# Patient Record
Sex: MALE | Race: WHITE | URBAN - METROPOLITAN AREA
[De-identification: names, ages, dates, MRNs, and addresses within clinical notes are randomized per-mention and may not be internally consistent; named-entity substitution may affect disease eponyms.]

---

## 2022-10-14 ENCOUNTER — OFFICE VISIT (OUTPATIENT)
Dept: URBAN - METROPOLITAN AREA CLINIC 17 | Facility: CLINIC | Age: 72
End: 2022-10-14
Payer: MEDICARE

## 2022-10-14 DIAGNOSIS — H25.813 COMBINED FORMS OF AGE-RELATED CATARACT, BILATERAL: ICD-10-CM

## 2022-10-14 DIAGNOSIS — H04.123 DRY EYE SYNDROME OF BILATERAL LACRIMAL GLANDS: ICD-10-CM

## 2022-10-14 DIAGNOSIS — E11.9 TYPE 2 DIABETES MELLITUS W/O COMPLICATION: Primary | ICD-10-CM

## 2022-10-14 DIAGNOSIS — H40.013 OPEN ANGLE WITH BORDERLINE FINDINGS, LOW RISK, BILATERAL: ICD-10-CM

## 2022-10-14 PROCEDURE — 92133 CPTRZD OPH DX IMG PST SGM ON: CPT

## 2022-10-14 PROCEDURE — 99204 OFFICE O/P NEW MOD 45 MIN: CPT

## 2022-10-14 ASSESSMENT — INTRAOCULAR PRESSURE
OS: 19
OD: 19

## 2022-10-14 NOTE — IMPRESSION/PLAN
Impression: Open angle with borderline findings, low risk, bilateral: H40.013.
-RNFL OCT ordered today showing borderline thin superior and inferior OD, with borderline thin superior OS
-(-)family history
-pressures of 19/19 today Plan: Patient educated on ocular findings. Condition and IOP stable without medication. Careful observation was discussed and is strongly recommended to prevent possible future vision loss. Will continue to monitor intraocular pressure and testing in clinic at regular intervals. No treatment is being implemented at this time.

## 2022-10-14 NOTE — IMPRESSION/PLAN
Impression: Type 2 diabetes mellitus w/o complication: X62.2. Plan: Patient educated on all ocular findings. Stressed importance of following up with primary care physician, tight control over blood sugar, maintaining healthy diet, strict adherence to medication, and exercise. Patient is advised that a yearly ophthalmic exam is recommended. Return sooner if any new symptoms.

## 2022-10-14 NOTE — IMPRESSION/PLAN
Impression: Combined forms of age-related cataract, bilateral: H25.813. Plan: Patient educated on ocular findings. Visual functioning is good, and cataract surgery is not required at this time. Further advised there is no specific urgency for cataract surgery. They were also advised that having cataract surgery does not mean they will not need to use glasses or contact lenses. We will continue to monitor and they are advised to call or return if any new symptoms.

## 2023-10-23 ENCOUNTER — OFFICE VISIT (OUTPATIENT)
Dept: URBAN - METROPOLITAN AREA CLINIC 17 | Facility: CLINIC | Age: 73
End: 2023-10-23
Payer: MEDICARE

## 2023-10-23 DIAGNOSIS — H43.811 VITREOUS DEGENERATION, RIGHT EYE: ICD-10-CM

## 2023-10-23 DIAGNOSIS — E11.9 TYPE 2 DIABETES MELLITUS W/O COMPLICATION: Primary | ICD-10-CM

## 2023-10-23 DIAGNOSIS — H25.813 COMBINED FORMS OF AGE-RELATED CATARACT, BILATERAL: ICD-10-CM

## 2023-10-23 DIAGNOSIS — H40.1134 PRIMARY OPEN-ANGLE GLAUCOMA, BILATERAL, INDETERMINATE STAGE: ICD-10-CM

## 2023-10-23 PROCEDURE — 92133 CPTRZD OPH DX IMG PST SGM ON: CPT | Performed by: OPTOMETRIST

## 2023-10-23 PROCEDURE — 76514 ECHO EXAM OF EYE THICKNESS: CPT | Performed by: OPTOMETRIST

## 2023-10-23 PROCEDURE — 99214 OFFICE O/P EST MOD 30 MIN: CPT | Performed by: OPTOMETRIST

## 2023-10-23 RX ORDER — LATANOPROST 50 UG/ML
0.005 % SOLUTION OPHTHALMIC
Qty: 7.5 | Refills: 1 | Status: ACTIVE
Start: 2023-10-23

## 2023-10-23 ASSESSMENT — INTRAOCULAR PRESSURE
OD: 18
OS: 18

## 2024-01-05 ENCOUNTER — OFFICE VISIT (OUTPATIENT)
Dept: URBAN - METROPOLITAN AREA CLINIC 17 | Facility: CLINIC | Age: 74
End: 2024-01-05
Payer: MEDICARE

## 2024-01-05 DIAGNOSIS — H40.1132 PRIMARY OPEN-ANGLE GLAUCOMA, BILATERAL, MODERATE STAGE: Primary | ICD-10-CM

## 2024-01-05 DIAGNOSIS — H40.1134 PRIMARY OPEN-ANGLE GLAUCOMA, BILATERAL, INDETERMINATE STAGE: ICD-10-CM

## 2024-01-05 PROCEDURE — 99213 OFFICE O/P EST LOW 20 MIN: CPT | Performed by: OPTOMETRIST

## 2024-01-05 PROCEDURE — 92083 EXTENDED VISUAL FIELD XM: CPT | Performed by: OPTOMETRIST

## 2024-01-05 RX ORDER — LATANOPROST 50 UG/ML
0.005 % SOLUTION OPHTHALMIC
Qty: 7.5 | Refills: 1 | Status: ACTIVE
Start: 2024-01-05

## 2024-01-05 ASSESSMENT — INTRAOCULAR PRESSURE
OD: 16
OS: 17

## 2024-04-09 ENCOUNTER — OFFICE VISIT (OUTPATIENT)
Dept: URBAN - METROPOLITAN AREA CLINIC 17 | Facility: CLINIC | Age: 74
End: 2024-04-09
Payer: MEDICARE

## 2024-04-09 DIAGNOSIS — H40.1121 PRIMARY OPEN-ANGLE GLAUCOMA, LEFT EYE, MILD STAGE: ICD-10-CM

## 2024-04-09 DIAGNOSIS — H40.1112 PRIMARY OPEN-ANGLE GLAUCOMA, RIGHT EYE, MODERATE STAGE: Primary | ICD-10-CM

## 2024-04-09 PROCEDURE — 99213 OFFICE O/P EST LOW 20 MIN: CPT | Performed by: OPTOMETRIST

## 2024-04-09 RX ORDER — LATANOPROST 50 UG/ML
0.005 % SOLUTION OPHTHALMIC
Qty: 7.5 | Refills: 1 | Status: ACTIVE
Start: 2024-04-09

## 2024-04-09 ASSESSMENT — INTRAOCULAR PRESSURE
OD: 16
OS: 15

## 2024-10-15 ENCOUNTER — OFFICE VISIT (OUTPATIENT)
Dept: URBAN - METROPOLITAN AREA CLINIC 17 | Facility: CLINIC | Age: 74
End: 2024-10-15
Payer: MEDICARE

## 2024-10-15 DIAGNOSIS — H43.811 VITREOUS DEGENERATION, RIGHT EYE: ICD-10-CM

## 2024-10-15 DIAGNOSIS — H40.1112 PRIMARY OPEN-ANGLE GLAUCOMA, RIGHT EYE, MODERATE STAGE: ICD-10-CM

## 2024-10-15 DIAGNOSIS — H40.1121 PRIMARY OPEN-ANGLE GLAUCOMA, LEFT EYE, MILD STAGE: ICD-10-CM

## 2024-10-15 DIAGNOSIS — H25.813 COMBINED FORMS OF AGE-RELATED CATARACT, BILATERAL: ICD-10-CM

## 2024-10-15 DIAGNOSIS — E11.9 TYPE 2 DIABETES MELLITUS W/O COMPLICATION: Primary | ICD-10-CM

## 2024-10-15 PROCEDURE — 92133 CPTRZD OPH DX IMG PST SGM ON: CPT | Performed by: OPTOMETRIST

## 2024-10-15 PROCEDURE — 99214 OFFICE O/P EST MOD 30 MIN: CPT | Performed by: OPTOMETRIST

## 2024-10-15 RX ORDER — LATANOPROST 50 UG/ML
0.005 % SOLUTION OPHTHALMIC
Qty: 7.5 | Refills: 1 | Status: ACTIVE
Start: 2024-10-15

## 2024-10-15 ASSESSMENT — INTRAOCULAR PRESSURE
OD: 19
OS: 17

## 2025-04-16 ENCOUNTER — OFFICE VISIT (OUTPATIENT)
Dept: URBAN - METROPOLITAN AREA CLINIC 17 | Facility: CLINIC | Age: 75
End: 2025-04-16
Payer: MEDICARE

## 2025-04-16 DIAGNOSIS — H40.1131 PRIMARY OPEN-ANGLE GLAUCOMA, MILD STAGE, BILATERAL: Primary | ICD-10-CM

## 2025-04-16 PROCEDURE — 99213 OFFICE O/P EST LOW 20 MIN: CPT | Performed by: OPTOMETRIST

## 2025-04-16 PROCEDURE — 92083 EXTENDED VISUAL FIELD XM: CPT | Performed by: OPTOMETRIST

## 2025-04-16 RX ORDER — LATANOPROST 50 UG/ML
0.005 % SOLUTION OPHTHALMIC
Qty: 7.5 | Refills: 1 | Status: ACTIVE
Start: 2025-04-16

## 2025-04-16 ASSESSMENT — INTRAOCULAR PRESSURE
OS: 14
OD: 15